# Patient Record
Sex: MALE | Race: BLACK OR AFRICAN AMERICAN | NOT HISPANIC OR LATINO | Employment: FULL TIME | URBAN - METROPOLITAN AREA
[De-identification: names, ages, dates, MRNs, and addresses within clinical notes are randomized per-mention and may not be internally consistent; named-entity substitution may affect disease eponyms.]

---

## 2023-04-18 LAB — SARS-COV-2 RESULT: NOT DETECTED

## 2023-04-20 ENCOUNTER — HOSPITAL ENCOUNTER (OUTPATIENT)
Dept: DATA CONVERSION | Facility: HOSPITAL | Age: 23
End: 2023-04-22
Attending: ORTHOPAEDIC SURGERY | Admitting: ORTHOPAEDIC SURGERY
Payer: COMMERCIAL

## 2023-04-20 DIAGNOSIS — S82.221N: ICD-10-CM

## 2023-04-20 DIAGNOSIS — T84.84XA PAIN DUE TO INTERNAL ORTHOPEDIC PROSTHETIC DEVICES, IMPLANTS AND GRAFTS, INITIAL ENCOUNTER (CMS-HCC): ICD-10-CM

## 2023-04-20 DIAGNOSIS — J96.01 ACUTE RESPIRATORY FAILURE WITH HYPOXIA (MULTI): ICD-10-CM

## 2023-04-20 DIAGNOSIS — Z79.82 LONG TERM (CURRENT) USE OF ASPIRIN: ICD-10-CM

## 2023-04-20 DIAGNOSIS — D62 ACUTE POSTHEMORRHAGIC ANEMIA: ICD-10-CM

## 2023-04-21 LAB
ANION GAP IN SER/PLAS: 15 MMOL/L (ref 10–20)
CALCIUM (MG/DL) IN SER/PLAS: 9.1 MG/DL (ref 8.6–10.6)
CARBON DIOXIDE, TOTAL (MMOL/L) IN SER/PLAS: 28 MMOL/L (ref 21–32)
CHLORIDE (MMOL/L) IN SER/PLAS: 102 MMOL/L (ref 98–107)
CREATININE (MG/DL) IN SER/PLAS: 0.88 MG/DL (ref 0.5–1.3)
ERYTHROCYTE DISTRIBUTION WIDTH (RATIO) BY AUTOMATED COUNT: 12.9 % (ref 11.5–14.5)
ERYTHROCYTE DISTRIBUTION WIDTH (RATIO) BY AUTOMATED COUNT: NORMAL
ERYTHROCYTE MEAN CORPUSCULAR HEMOGLOBIN CONCENTRATION (G/DL) BY AUTOMATED: 32.8 G/DL (ref 32–36)
ERYTHROCYTE MEAN CORPUSCULAR HEMOGLOBIN CONCENTRATION (G/DL) BY AUTOMATED: NORMAL
ERYTHROCYTE MEAN CORPUSCULAR VOLUME (FL) BY AUTOMATED COUNT: 91 FL (ref 80–100)
ERYTHROCYTE MEAN CORPUSCULAR VOLUME (FL) BY AUTOMATED COUNT: NORMAL
ERYTHROCYTES (10*6/UL) IN BLOOD BY AUTOMATED COUNT: 4.1 X10E12/L (ref 4.5–5.9)
ERYTHROCYTES (10*6/UL) IN BLOOD BY AUTOMATED COUNT: NORMAL
GFR MALE: >90 ML/MIN/1.73M2
GLUCOSE (MG/DL) IN SER/PLAS: 110 MG/DL (ref 74–99)
HEMATOCRIT (%) IN BLOOD BY AUTOMATED COUNT: 37.2 % (ref 41–52)
HEMATOCRIT (%) IN BLOOD BY AUTOMATED COUNT: NORMAL
HEMOGLOBIN (G/DL) IN BLOOD: 12.2 G/DL (ref 13.5–17.5)
HEMOGLOBIN (G/DL) IN BLOOD: NORMAL
LEUKOCYTES (10*3/UL) IN BLOOD BY AUTOMATED COUNT: 6.9 X10E9/L (ref 4.4–11.3)
LEUKOCYTES (10*3/UL) IN BLOOD BY AUTOMATED COUNT: NORMAL
NRBC (PER 100 WBCS) BY AUTOMATED COUNT: 0 /100 WBC (ref 0–0)
NRBC (PER 100 WBCS) BY AUTOMATED COUNT: NORMAL
PLATELETS (10*3/UL) IN BLOOD AUTOMATED COUNT: 198 X10E9/L (ref 150–450)
PLATELETS (10*3/UL) IN BLOOD AUTOMATED COUNT: NORMAL
POTASSIUM (MMOL/L) IN SER/PLAS: 4.4 MMOL/L (ref 3.5–5.3)
SODIUM (MMOL/L) IN SER/PLAS: 141 MMOL/L (ref 136–145)
UREA NITROGEN (MG/DL) IN SER/PLAS: 7 MG/DL (ref 6–23)

## 2023-04-22 LAB
ANION GAP IN SER/PLAS: 12 MMOL/L (ref 10–20)
CALCIUM (MG/DL) IN SER/PLAS: 8.9 MG/DL (ref 8.6–10.6)
CARBON DIOXIDE, TOTAL (MMOL/L) IN SER/PLAS: 30 MMOL/L (ref 21–32)
CHLORIDE (MMOL/L) IN SER/PLAS: 102 MMOL/L (ref 98–107)
CREATININE (MG/DL) IN SER/PLAS: 0.86 MG/DL (ref 0.5–1.3)
ERYTHROCYTE DISTRIBUTION WIDTH (RATIO) BY AUTOMATED COUNT: 12.9 % (ref 11.5–14.5)
ERYTHROCYTE MEAN CORPUSCULAR HEMOGLOBIN CONCENTRATION (G/DL) BY AUTOMATED: 32.4 G/DL (ref 32–36)
ERYTHROCYTE MEAN CORPUSCULAR VOLUME (FL) BY AUTOMATED COUNT: 91 FL (ref 80–100)
ERYTHROCYTES (10*6/UL) IN BLOOD BY AUTOMATED COUNT: 4.12 X10E12/L (ref 4.5–5.9)
GFR MALE: >90 ML/MIN/1.73M2
GLUCOSE (MG/DL) IN SER/PLAS: 86 MG/DL (ref 74–99)
GRAM STAIN: NORMAL
HEMATOCRIT (%) IN BLOOD BY AUTOMATED COUNT: 37.3 % (ref 41–52)
HEMOGLOBIN (G/DL) IN BLOOD: 12.1 G/DL (ref 13.5–17.5)
LEUKOCYTES (10*3/UL) IN BLOOD BY AUTOMATED COUNT: 6.5 X10E9/L (ref 4.4–11.3)
NRBC (PER 100 WBCS) BY AUTOMATED COUNT: 0 /100 WBC (ref 0–0)
PLATELETS (10*3/UL) IN BLOOD AUTOMATED COUNT: 177 X10E9/L (ref 150–450)
POTASSIUM (MMOL/L) IN SER/PLAS: 4.1 MMOL/L (ref 3.5–5.3)
SODIUM (MMOL/L) IN SER/PLAS: 140 MMOL/L (ref 136–145)
TISSUE/WOUND CULTURE/SMEAR: NORMAL
UREA NITROGEN (MG/DL) IN SER/PLAS: 7 MG/DL (ref 6–23)

## 2023-04-23 LAB
ANION GAP IN SER/PLAS: NORMAL
CALCIUM (MG/DL) IN SER/PLAS: NORMAL
CARBON DIOXIDE, TOTAL (MMOL/L) IN SER/PLAS: NORMAL
CHLORIDE (MMOL/L) IN SER/PLAS: NORMAL
CREATININE (MG/DL) IN SER/PLAS: NORMAL
GFR FEMALE: NORMAL
GFR MALE: NORMAL
GLUCOSE (MG/DL) IN SER/PLAS: NORMAL
POTASSIUM (MMOL/L) IN SER/PLAS: NORMAL
SODIUM (MMOL/L) IN SER/PLAS: NORMAL
UREA NITROGEN (MG/DL) IN SER/PLAS: NORMAL

## 2023-04-24 LAB
ERYTHROCYTE DISTRIBUTION WIDTH (RATIO) BY AUTOMATED COUNT: NORMAL
ERYTHROCYTE MEAN CORPUSCULAR HEMOGLOBIN CONCENTRATION (G/DL) BY AUTOMATED: NORMAL
ERYTHROCYTE MEAN CORPUSCULAR VOLUME (FL) BY AUTOMATED COUNT: NORMAL
ERYTHROCYTES (10*6/UL) IN BLOOD BY AUTOMATED COUNT: NORMAL
HEMATOCRIT (%) IN BLOOD BY AUTOMATED COUNT: NORMAL
HEMOGLOBIN (G/DL) IN BLOOD: NORMAL
LEUKOCYTES (10*3/UL) IN BLOOD BY AUTOMATED COUNT: NORMAL
NRBC (PER 100 WBCS) BY AUTOMATED COUNT: NORMAL
PLATELETS (10*3/UL) IN BLOOD AUTOMATED COUNT: NORMAL

## 2023-05-08 LAB
FUNGAL CULTURE/SMEAR: NORMAL
FUNGAL SMEAR: NORMAL

## 2023-08-07 ENCOUNTER — OFFICE VISIT (OUTPATIENT)
Dept: PRIMARY CARE | Facility: CLINIC | Age: 23
End: 2023-08-07
Payer: COMMERCIAL

## 2023-08-07 ENCOUNTER — LAB (OUTPATIENT)
Dept: LAB | Facility: LAB | Age: 23
End: 2023-08-07
Payer: COMMERCIAL

## 2023-08-07 VITALS
HEART RATE: 123 BPM | WEIGHT: 135 LBS | BODY MASS INDEX: 19.37 KG/M2 | DIASTOLIC BLOOD PRESSURE: 82 MMHG | TEMPERATURE: 98.2 F | SYSTOLIC BLOOD PRESSURE: 138 MMHG

## 2023-08-07 DIAGNOSIS — D50.8 OTHER IRON DEFICIENCY ANEMIA: ICD-10-CM

## 2023-08-07 DIAGNOSIS — R04.0 EPISTAXIS: Primary | ICD-10-CM

## 2023-08-07 LAB
ALANINE AMINOTRANSFERASE (SGPT) (U/L) IN SER/PLAS: 8 U/L (ref 10–52)
ALBUMIN (G/DL) IN SER/PLAS: 4.2 G/DL (ref 3.4–5)
ALKALINE PHOSPHATASE (U/L) IN SER/PLAS: 88 U/L (ref 33–120)
ANION GAP IN SER/PLAS: 12 MMOL/L (ref 10–20)
ASPARTATE AMINOTRANSFERASE (SGOT) (U/L) IN SER/PLAS: 13 U/L (ref 9–39)
BILIRUBIN TOTAL (MG/DL) IN SER/PLAS: 0.3 MG/DL (ref 0–1.2)
CALCIUM (MG/DL) IN SER/PLAS: 9.4 MG/DL (ref 8.6–10.6)
CARBON DIOXIDE, TOTAL (MMOL/L) IN SER/PLAS: 31 MMOL/L (ref 21–32)
CHLORIDE (MMOL/L) IN SER/PLAS: 103 MMOL/L (ref 98–107)
CREATININE (MG/DL) IN SER/PLAS: 0.92 MG/DL (ref 0.5–1.3)
ERYTHROCYTE DISTRIBUTION WIDTH (RATIO) BY AUTOMATED COUNT: 12.1 % (ref 11.5–14.5)
ERYTHROCYTE MEAN CORPUSCULAR HEMOGLOBIN CONCENTRATION (G/DL) BY AUTOMATED: 32.7 G/DL (ref 32–36)
ERYTHROCYTE MEAN CORPUSCULAR VOLUME (FL) BY AUTOMATED COUNT: 88 FL (ref 80–100)
ERYTHROCYTES (10*6/UL) IN BLOOD BY AUTOMATED COUNT: 4.77 X10E12/L (ref 4.5–5.9)
FERRITIN (UG/LL) IN SER/PLAS: 178 UG/L (ref 20–300)
GFR MALE: >90 ML/MIN/1.73M2
GLUCOSE (MG/DL) IN SER/PLAS: 86 MG/DL (ref 74–99)
HEMATOCRIT (%) IN BLOOD BY AUTOMATED COUNT: 41.9 % (ref 41–52)
HEMOGLOBIN (G/DL) IN BLOOD: 13.7 G/DL (ref 13.5–17.5)
IRON (UG/DL) IN SER/PLAS: 37 UG/DL (ref 35–150)
IRON BINDING CAPACITY (UG/DL) IN SER/PLAS: 314 UG/DL (ref 240–445)
IRON SATURATION (%) IN SER/PLAS: 12 % (ref 25–45)
LEUKOCYTES (10*3/UL) IN BLOOD BY AUTOMATED COUNT: 10.6 X10E9/L (ref 4.4–11.3)
NRBC (PER 100 WBCS) BY AUTOMATED COUNT: 0 /100 WBC (ref 0–0)
PLATELETS (10*3/UL) IN BLOOD AUTOMATED COUNT: 257 X10E9/L (ref 150–450)
POTASSIUM (MMOL/L) IN SER/PLAS: 4.5 MMOL/L (ref 3.5–5.3)
PROTEIN TOTAL: 7.6 G/DL (ref 6.4–8.2)
SODIUM (MMOL/L) IN SER/PLAS: 141 MMOL/L (ref 136–145)
THYROTROPIN (MIU/L) IN SER/PLAS BY DETECTION LIMIT <= 0.05 MIU/L: 0.72 MIU/L (ref 0.44–3.98)
UREA NITROGEN (MG/DL) IN SER/PLAS: 12 MG/DL (ref 6–23)

## 2023-08-07 PROCEDURE — 36415 COLL VENOUS BLD VENIPUNCTURE: CPT

## 2023-08-07 PROCEDURE — 80053 COMPREHEN METABOLIC PANEL: CPT

## 2023-08-07 PROCEDURE — 99213 OFFICE O/P EST LOW 20 MIN: CPT | Performed by: INTERNAL MEDICINE

## 2023-08-07 PROCEDURE — 1036F TOBACCO NON-USER: CPT | Performed by: INTERNAL MEDICINE

## 2023-08-07 PROCEDURE — 85027 COMPLETE CBC AUTOMATED: CPT

## 2023-08-07 PROCEDURE — 84443 ASSAY THYROID STIM HORMONE: CPT

## 2023-08-07 PROCEDURE — 83550 IRON BINDING TEST: CPT

## 2023-08-07 PROCEDURE — 82728 ASSAY OF FERRITIN: CPT

## 2023-08-07 PROCEDURE — 83540 ASSAY OF IRON: CPT

## 2023-08-07 ASSESSMENT — ENCOUNTER SYMPTOMS
ABDOMINAL PAIN: 0
ARTHRALGIAS: 0
DIARRHEA: 0
COUGH: 0
DIZZINESS: 0
CONSTIPATION: 0
FATIGUE: 0
NECK PAIN: 0
FEVER: 0
MYALGIAS: 0
SORE THROAT: 1
HEADACHES: 0
PALPITATIONS: 0
BLOOD IN STOOL: 0
CHILLS: 0
SHORTNESS OF BREATH: 0
BACK PAIN: 0

## 2023-08-07 NOTE — PATIENT INSTRUCTIONS
Make appointment with ENT if nosebleed persists  Blood work today  Follow-up in 3 to 6 months or sooner if needed

## 2023-08-07 NOTE — PROGRESS NOTES
Subjective   Patient ID: Romero Landis is a 22 y.o. male.    HPI  Seen today for complaints of blood through his nostrils.  He has been experiencing nosebleeding for  about a week.  He complains of runny nose, congestion and related upper respiratory symptoms.  No fever or chills.  He complains of some sore throat.    Review of Systems   Constitutional:  Negative for chills, fatigue and fever.   HENT:  Positive for congestion, postnasal drip and sore throat.    Respiratory:  Negative for cough and shortness of breath.    Cardiovascular:  Negative for chest pain, palpitations and leg swelling.   Gastrointestinal:  Negative for abdominal pain, blood in stool, constipation and diarrhea.   Endocrine: Negative for cold intolerance and heat intolerance.   Musculoskeletal:  Negative for arthralgias, back pain, myalgias and neck pain.   Neurological:  Negative for dizziness and headaches.       Objective   Physical Exam  Vitals reviewed.   Constitutional:       General: He is not in acute distress.     Appearance: Normal appearance.   HENT:      Head: Normocephalic and atraumatic.      Nose: Congestion present.      Comments: No active bleeding seen     Mouth/Throat:      Mouth: Mucous membranes are moist.   Eyes:      Extraocular Movements: Extraocular movements intact.      Conjunctiva/sclera: Conjunctivae normal.   Cardiovascular:      Rate and Rhythm: Normal rate and regular rhythm.      Pulses: Normal pulses.   Pulmonary:      Effort: Pulmonary effort is normal. No respiratory distress.      Breath sounds: Normal breath sounds.   Abdominal:      General: Bowel sounds are normal. There is no distension.      Tenderness: There is no abdominal tenderness.   Musculoskeletal:         General: No swelling or tenderness. Normal range of motion.      Cervical back: Normal range of motion.   Skin:     General: Skin is warm.   Neurological:      General: No focal deficit present.      Mental Status: He is alert.       Coordination: Coordination normal.      Gait: Gait normal.   Psychiatric:         Mood and Affect: Mood normal.         Behavior: Behavior normal.         Assessment/Plan   Diagnoses and all orders for this visit:  Epistaxis  Comments:  Epistaxis-patient to make appointment with ENT  Orders:  -     Referral to ENT; Future  Other iron deficiency anemia  Comments:  Check iron panel  Will consider sending to hematology if anemia is worsening  Orders:  -     CBC; Future  -     Comprehensive Metabolic Panel; Future  -     TSH with reflex to Free T4 if abnormal; Future  -     Iron and TIBC; Future  -     Ferritin; Future  Blood work is ordered  Follow-up in 3 to 6 months or sooner if needed

## 2023-08-08 ENCOUNTER — TELEPHONE (OUTPATIENT)
Dept: PRIMARY CARE | Facility: CLINIC | Age: 23
End: 2023-08-08
Payer: COMMERCIAL

## 2023-09-07 VITALS
OXYGEN SATURATION: 100 % | DIASTOLIC BLOOD PRESSURE: 70 MMHG | WEIGHT: 135.14 LBS | SYSTOLIC BLOOD PRESSURE: 117 MMHG | HEART RATE: 81 BPM | BODY MASS INDEX: 18.92 KG/M2 | HEIGHT: 71 IN

## 2023-09-14 NOTE — DISCHARGE SUMMARY
Send Summary:   Discharge Summary Providers:  Provider Role Provider Name   · Attending Nomi Roper       Note Recipients: Nomi Roper MD Parmar, Rajinder, MD       Discharge:    Summary:   Admission Date: .20-Apr-2023 05:51:00   Discharge Date: 22-Apr-2023   Attending Physician at Discharge: Nomi Roper   Admission Reason: s/p IMN R tibia   Final Discharge Diagnoses: Tibia/fibula fracture,  shaft, right, sequela   Procedures: Date: 20-Apr-2023 09:52:00  Procedure Name: 1. Removal of hardware left tibia  2. Removal of hardware right tibia  3. 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 Patient is now s/p R tibia IMN exchange nailing and L tibia JOSÉ MIGUEL  on 4/20/23 by Dr. Roper. On the day of surgery, patient was identified in the pre-operative holding area and agreeable to proceed with surgery. Written consent was obtained.  Please see operative note for further details of this procedure. Patient  received 24 hours of ori-operative antibiotics. Patient recovered in the PACU before transfer to a regular nursing floor. Patient was started on oxycodone, tylenol, and hydromorphone for pain control and ASA 81 mg bid for DVT prophylaxis. Physical therapy  recommended continued recovery at home with continued physical therapy and wound care. On the day of discharge, patient was afebrile with stable vital signs. Patient was neurovascularly intact at time of discharge. Patient was discharged with prescription  of ASA 81 mg bid for DVT prophylaxis for 4 weeks. Patient will follow-up with Dr. Roper in 2 weeks for post-operative visit.     Immunizations:    Immunizations:  26-Feb-2022   SARS-CoV-2 (COVID-19): Immunizations,  26-Feb-2022      Discharge Information:    and Continuing Care:   Lab Results - Pending:    Culture, Fungus + Fungus Stain Drawn at 20-Apr-2023 08:52:00  Radiology Results - Pending: None   Discharge Instructions:    Activity:           activity with assistance.          Weight-bearing Instructions: full weight bearing bilateral leg.      Nutrition/Diet:           resume normal diet    Wound Care:           Wound Site:   Bilateral lower legs          Wound Type:   surgical incision          Instructions:   no lotions, creams, or tub soaks          Other Instructions:   Leave operative dressing in place until post op day 7. Then remove and leave incision open to air. Let water run freely over incision when showering, do not scrub. Do not soak in pool or tub.    Home Care Certification:           Home Care Agency:    [facility] [phone]          Skilled Disciplines Ordered:   PT    Home Care Services:           Home Care Skilled Service:   Rehab (PT/OT/SP eval and treat)    Follow Up Appointments:    Follow-Up Appointment 01:           Physician/Dept/Service:   Luis Coles PA-C / Orthopedic Surgery          Scheduled Date/Time:   02-May-2023 08:40          Location:   [place] [address]          Phone Number:   [phone]    Discharge Medications: Home Medication   Colace 100 mg oral capsule - 1 cap(s) orally 2 times a day for constipation while taking narcotics  omeprazole 10 mg oral delayed release capsule - 1 cap(s) orally once a day while taking aspirin  Calcet 500 mg-400 intl units (10 mcg) oral tablet, chewable - 1 tab(s) chewed 2 times a day   ondansetron 4 mg oral tablet - 1 tab(s) orally every 6 hours for nausea  oxycodone-acetaminophen 5 mg-325 mg oral tablet - 1 tab(s) orally every 6 hours for severe pain only  Adult Aspirin Regimen 81 mg oral delayed release tablet - 1 tab(s) orally 2 times a day      PRN  Medication     DNR Status:   ·  Code Status Code Status order at time of discharge: Full Code     Attestation:   Note Completion:  I am a:  Resident/Fellow   Attending Attestation I saw and evaluated the patient.  I personally obtained the key and critical portions of the history and physical exam or was physically present for key and  critical portions performed by the resident/fellow. I reviewed the resident/fellow?s documentation and discussed the patient with the resident/fellow.  I agree with the resident/fellow?s medical decision making as documented in the note.     I personally evaluated the patient on 21-Apr-2023         Electronic Signatures:  Nomi Roper)  (Signed 03-May-2023 10:15)   Authored: Summary Content, Ongoing Care, Note Completion   Co-Signer: Send Summary, Summary Content, Immunizations, Ongoing Care, DNR Status, Note Completion  José Aguero (Resident))  (Signed 22-Apr-2023 18:36)   Authored: Send Summary, Summary Content, Immunizations,  Ongoing Care, DNR Status, Note Completion      Last Updated: 03-May-2023 10:15 by Nomi Roper)

## 2023-09-14 NOTE — PROGRESS NOTES
Service: Orthopaedics     Subjective Data:   HUDSON CATALAN is a 22 year old Male who is Hospital Day # 3 and POD #2 for 1. Removal of hardware left tibia;2. Removal of hardware right tibia;3. Intra-medullary nail right tibia;4. 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     no acute events overnight  pain controlled  tolerating PO  denies new or worsening numbness/paresthesias in extremity   denies fevers/chills/chest pain/shortness of breath/nausea/vomiting.    Objective Data:     Objective Information:      T   P  R  BP   MAP  SpO2   Value  37.1  87  18  95/62      96%  Date/Time 4/22 7:24 4/22 7:24 4/22 7:24 4/22 7:24    4/22 7:24  Range  (36.6C - 37.5C )  (75 - 92 )  (16 - 19 )  (95 - 115 )/ (62 - 77 )    (95% - 99% )  Highest temp of 37.5 C was recorded at 4/21 12:10      Pain reported at 4/21 20:23: 5 = Moderate    Physical Exam Narrative:  ·  Physical Exam:      Constitutional: Well developed, awake/alert/oriented x3, no distress, alert and cooperative  Eyes: EOMI, clear sclera  Head/Neck: Normocephalic/atraumatic  Respiratory/Thorax: Normal Respiration rate, good chest expansion, thorax symmetric  Cardiovascular: Extremities WWP  Musculoskeletal:  BLE: Dressing c/d/i  - SILT s/s/sp/dp/t  - fires PF/DF/EHL  - Toes WWP, 2+ DP pulses  - Calf soft and supple bilat   Extremities: See MSK  Neurological: See MSK  Lymphatic: No significant lymphadenopathy  Psychological: Appropriate mood and behavior  Skin: Warm and dry, no lesions, no rashes     Medication:    Medications:          Continuous Medications       --------------------------------  No continuous medications are active       Scheduled Medications       --------------------------------    1. Acetaminophen:  650  mg  Oral  Every 6 Hours    2. Aspirin Enteric Coated:  81  mg  Oral  2 Times a Day    3. Docusate:  100  mg  Oral  2 Times a Day    4. Polyethylene Glycol:  17  gram(s)  Oral  2 Times a Day    5. Sennosides:  1  tablet(s)   Oral  Daily         PRN Medications       --------------------------------    1. diphenhydrAMINE:  25  mg  Oral  Every 6 Hours    2. HYDROmorphone Injectable:  0.5  mg  IntraVenous Push  Every 4 Hours    3. Ondansetron Injectable:  4  mg  IntraVenous Push  Every 6 Hours    4. oxyCODONE Immediate Release:  5  mg  Oral  Every 4 Hours    5. oxyCODONE Immediate Release:  10  mg  Oral  Every 4 Hours    6. Sore Throat Lozenge:  1  lozenge(s)  Oral  Every 4 Hours        Recent Lab Results:    Results:    CBC: 4/22/2023 07:11              \     Hgb     /                              \     12.1 L    /  WBC  ----------------  Plt               6.5       ----------------    177              /     Hct     \                              /     37.3 L    \            RBC: 4.12 L    MCV: 91           BMP: 4/22/2023 07:11  NA+        Cl-     BUN  /                         140    102    7  /  --------------------------------  Glucose                ---------------------------  86    K+     HCO3-   Creat \                         4.1  30    0.86  \  Calcium : 8.9     Anion Gap : 12      Assessment and Plan:   Code Status:  ·  Code Status Full Code     Assessment:    Romero Landis is a 22M s/p GILLIAN VALDEZ, exchange FRANKI stewart on 4/20/23 with Dr. Roper    - Clear liquid diet, okay to advance as tolerated. Bowel Regimen: Colace, senna, dulcolax.  - Multimodal pain therapy: scheduled tylenol, prn oxycodone, dilaudid prn for breakthrough  - mIVF to continue until patient is tolerating good PO intake, HLIV w/ good PO; Will monitor BMP on POD 1 and prn thereafter  - Weightbearing: WBAT BLE. PT/OT consult, to see by POD1 at the latest.   - Encourage IS   - Perioperative ancef q8 for 24 hours  - No indication for transfusion; monitor CBC POD1, repeat prn thereafter.  - DVT PPx: SCD, ASA 81 mg BID for chemoPPx   - Maintain PIV, May dc venkata on POD1  - Continue home meds:   - Glycemic: No issues   - Culture negative to  date  - PT/OT rec [facility]    Dispo: dispo [facility]    This plan was discussed with the attending, Dr. Roper    Staffed with attending. Please reach out with any questions.    Rafael Li DO  PGY-1  [facility]  [facility] preferred   Please page #[phone] on weekends or on weekdays after 6 PM.          Attestation:   Note Completion:  I am a:  Resident/Fellow   Attending Attestation I reviewed the resident/fellow?s documentation and discussed the patient with the resident/fellow.  I agree with the resident/fellow?s medical  decision making as documented in the note.          Electronic Signatures:  Rafael Li (Resident))  (Signed 22-Apr-2023 09:26)   Authored: Service, Subjective Data, Objective Data, Assessment  and Plan, Note Completion  Nomi Roper)  (Signed 03-May-2023 10:11)   Authored: Note Completion   Co-Signer: Service, Subjective Data, Objective Data, Assessment and Plan, Note Completion      Last Updated: 03-May-2023 10:11 by Nomi Roper)

## 2023-09-14 NOTE — PROGRESS NOTES
Service: Orthopaedics     Subjective Data:   HUDSON CATALAN is a 22 year old Male who is Hospital Day # 2 and POD #1 for 1. Removal of hardware left tibia;2. Removal of hardware right tibia;3. Intra-medullary nail right tibia;4. 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     no acute events overnight  pain controlled  tolerating PO  denies new or worsening numbness/paresthesias in extremity   denies fevers/chills/chest pain/shortness of breath/nausea/vomiting.    Objective Data:     Objective Information:      T   P  R  BP   MAP  SpO2   Value  36.6  88  16  109/65      97%  Date/Time 4/21 4:00 4/21 4:00 4/21 4:00 4/21 4:00    4/21 4:00  Range  (36.5C - 37.1C )  (79 - 90 )  (16 - 16 )  (106 - 119 )/ (65 - 77 )    (97% - 100% )  Highest temp of 37.1 C was recorded at 4/20 15:37      Pain reported at 4/21 5:37: 8 = Severe    Physical Exam Narrative:  ·  Physical Exam:    S: no acute events overnight, pain controlled, tolerating diet  denies numbness/paresthesias in operative extremity   denies fevers/chills/chest pain/shortness of breath/chest pain/nausea/vomiting     O:  Constitutional: Well developed, awake/alert/oriented x3, no distress, alert and cooperative  Eyes: EOMI, clear sclera  Head/Neck: Normocephalic/atraumatic  Respiratory/Thorax: Normal Respiration rate, good chest expansion, thorax symmetric  Cardiovascular: Extremities WWP  Musculoskeletal:  BLE: Dressing c/d/i  - SILT s/s/sp/dp/t  - fires PF/DF/EHL  - Toes WWP, 2+ DP pulses  - Calf soft and supple bilat   Extremities: See MSK  Neurological: See MSK  Lymphatic: No significant lymphadenopathy  Psychological: Appropriate mood and behavior  Skin: Warm and dry, no lesions, no rashes     Assessment and Plan:   Code Status:  ·  Code Status Full Code     Assessment:    Hudson Catalan is a 22M s/p R tibia JOSÉ MIGUEL, exchange nialing, L tibia JOSÉ MIGUEL on 4/20/23 with Dr. Roper    - Clear liquid diet, okay to advance as tolerated. Bowel Regimen:  Colace, senna, dulcolax.  - Multimodal pain therapy: scheduled tylenol, prn oxycodone, dilaudid prn for breakthrough  - mIVF to continue until patient is tolerating good PO intake, HLIV w/ good PO; Will monitor BMP on POD 1 and prn thereafter  - Weightbearing: WBAT BLE. PT/OT consult, to see by POD1 at the latest.   - Encourage IS   - Perioperative ancef q8 for 24 hours  - No indication for transfusion; monitor CBC POD1, repeat prn thereafter.  - DVT PPx: SCD, ASA 81 mg BID for chemoPPx   - Maintain PIV, May dc hastnigs on POD1  - Continue home meds:   - Glycemic: No issues     Dispo: PT/OT, cultures    This plan was discussed with the attending, Dr. Roper    Staffed with attending. Please reach out with any questions.    Rafael Li DO  PGY-1  [facility]  [employer] preferred   Please page #[phone] on weekends or on weekdays after 6 PM.          Attestation:   Note Completion:  I am a:  Resident/Fellow   Attending Attestation I saw and evaluated the patient.  I personally obtained the key and critical portions of the history and physical exam or was physically present for key and  critical portions performed by the resident/fellow. I reviewed the resident/fellow?s documentation and discussed the patient with the resident/fellow.  I agree with the resident/fellow?s medical decision making as documented in the note.     I personally evaluated the patient on 21-Apr-2023         Electronic Signatures:  Rafael Li (Resident))  (Signed 21-Apr-2023 06:39)   Authored: Service, Subjective Data, Objective Data, Assessment  and Plan, Note Completion  Nomi Roper)  (Signed 03-May-2023 09:48)   Authored: Note Completion   Co-Signer: Service, Subjective Data, Objective Data, Assessment and Plan, Note Completion      Last Updated: 03-May-2023 09:48 by Nomi Roper)

## 2023-09-14 NOTE — H&P
History of Present Illness:   History Present Illness:  Reason for surgery: R tibia nonunion   HPI:    Pt presents for surgery    Allergies:        Allergies:  ·  No Known Allergies :     Home Medication Review:   Home Medications Reviewed: yes     Impression/Procedure:   ·  Impression and Planned Procedure: R tibia exchange IMN, L tibia JOSÉ MIGUEL       ERAS (Enhanced Recovery After Surgery):  ·  ERAS Patient: no       Physical Exam by System:    Constitutional: Well developed, awake/alert/oriented  x3, no distress, alert and cooperative   Respiratory/Thorax: Patent airways, CTAB, normal  breath sounds with good chest expansion, thorax symmetric   Cardiovascular: Regular, rate and rhythm, no murmurs,  2+ equal pulses of the extremities, normal S 1and S 2   Musculoskeletal: ROM intact, no joint swelling, normal  strength   Extremities: normal extremities, no cyanosis edema,  contusions or wounds, no clubbing   Neurological: alert and oriented x3, intact senses,  motor, response and reflexes, normal strength   Psychological: Appropriate mood and behavior   Skin: Warm and dry, no lesions, no rashes     Consent:   COVID-19 Consent:  ·  COVID-19 Risk Consent Surgeon has reviewed key risks related to the risk of jose roberto COVID-19 and if they contract COVID-19 what the risks are.     Attestation:   Note Completion:  I am a:  Resident/Fellow   Attending Attestation I saw and evaluated the patient.  I personally obtained the key and critical portions of the history and physical exam or was physically present for key and  critical portions performed by the resident/fellow. I reviewed the resident/fellow?s documentation and discussed the patient with the resident/fellow.  I agree with the resident/fellow?s medical decision making as documented in the note.     I personally evaluated the patient on 20-Apr-2023         Electronic Signatures:  Nomi Roper)  (Signed 01-May-2023 08:04)   Authored: Note  Completion   Co-Signer: History of Present Illness, Allergies, Home Medication Review, Impression/Procedure, ERAS, Physical Exam, Consent, Note Completion  José Aguero (Resident))  (Signed 20-Apr-2023 04:47)   Authored: History of Present Illness, Allergies, Home  Medication Review, Impression/Procedure, ERAS, Physical Exam, Consent, Note Completion      Last Updated: 01-May-2023 08:04 by Nomi Roper)

## 2023-09-22 PROBLEM — S52.90XA RADIUS FRACTURE: Status: ACTIVE | Noted: 2023-09-22

## 2023-09-22 PROBLEM — K64.4 EXTERNAL HEMORRHOID: Status: ACTIVE | Noted: 2023-09-22

## 2023-09-22 PROBLEM — R04.0 EPISTAXIS: Status: ACTIVE | Noted: 2023-09-22

## 2023-09-22 PROBLEM — S82.202G: Status: ACTIVE | Noted: 2023-09-22

## 2023-09-22 PROBLEM — T84.84XA PAINFUL ORTHOPAEDIC HARDWARE (CMS-HCC): Status: ACTIVE | Noted: 2023-09-22

## 2023-09-22 PROBLEM — K92.1 BLOOD IN STOOL: Status: ACTIVE | Noted: 2023-09-22

## 2023-09-22 PROBLEM — S82.209A TIBIA/FIBULA FRACTURE: Status: ACTIVE | Noted: 2023-09-22

## 2023-09-22 PROBLEM — S12.9XXA CERVICAL SPINE FRACTURE (MULTI): Status: ACTIVE | Noted: 2023-09-22

## 2023-09-22 PROBLEM — N50.9 SCROTAL LESION: Status: ACTIVE | Noted: 2023-09-22

## 2023-09-22 PROBLEM — S82.409A TIBIA/FIBULA FRACTURE: Status: ACTIVE | Noted: 2023-09-22

## 2023-09-22 PROBLEM — S82.252A CLOSED DISPLACED COMMINUTED FRACTURE OF SHAFT OF LEFT TIBIA: Status: ACTIVE | Noted: 2023-09-22

## 2023-09-22 PROBLEM — S52.023A OLECRANON FRACTURE: Status: ACTIVE | Noted: 2023-09-22

## 2023-09-22 RX ORDER — ACETAMINOPHEN 325 MG/1
TABLET ORAL
COMMUNITY
Start: 2022-07-10

## 2023-09-22 RX ORDER — DOCUSATE SODIUM 100 MG/1
CAPSULE, LIQUID FILLED ORAL
COMMUNITY
Start: 2022-07-27 | End: 2023-10-23 | Stop reason: WASHOUT

## 2023-09-22 RX ORDER — MUPIROCIN 20 MG/G
OINTMENT TOPICAL
COMMUNITY
Start: 2023-08-16 | End: 2023-10-23 | Stop reason: WASHOUT

## 2023-09-22 RX ORDER — LORATADINE 10 MG/1
CAPSULE, LIQUID FILLED ORAL
COMMUNITY
End: 2023-10-23 | Stop reason: WASHOUT

## 2023-09-22 RX ORDER — METHOCARBAMOL 500 MG/1
TABLET, FILM COATED ORAL
COMMUNITY
Start: 2022-07-27 | End: 2023-10-23 | Stop reason: WASHOUT

## 2023-09-22 RX ORDER — LIDOCAINE 560 MG/1
PATCH PERCUTANEOUS; TOPICAL; TRANSDERMAL
COMMUNITY
Start: 2022-07-10 | End: 2023-10-23 | Stop reason: WASHOUT

## 2023-09-22 RX ORDER — METHOCARBAMOL 500 MG/1
TABLET, FILM COATED ORAL 4 TIMES DAILY
COMMUNITY
Start: 2022-07-10 | End: 2023-10-23 | Stop reason: WASHOUT

## 2023-09-22 RX ORDER — GABAPENTIN 300 MG/1
1 CAPSULE ORAL NIGHTLY
COMMUNITY
Start: 2022-08-13

## 2023-09-22 RX ORDER — POLYETHYLENE GLYCOL 3350 17 G/17G
17 POWDER, FOR SOLUTION ORAL 2 TIMES DAILY
COMMUNITY
Start: 2022-07-10 | End: 2023-10-23 | Stop reason: WASHOUT

## 2023-09-22 RX ORDER — OXYCODONE HYDROCHLORIDE 5 MG/1
TABLET ORAL
COMMUNITY
Start: 2022-07-10 | End: 2023-10-23 | Stop reason: WASHOUT

## 2023-09-22 RX ORDER — AMOXICILLIN 500 MG/1
CAPSULE ORAL
COMMUNITY
Start: 2023-05-16 | End: 2023-10-23 | Stop reason: WASHOUT

## 2023-09-22 RX ORDER — BACITRACIN 500 [USP'U]/G
OINTMENT TOPICAL 2 TIMES DAILY
COMMUNITY
Start: 2022-07-10 | End: 2023-10-23 | Stop reason: WASHOUT

## 2023-09-22 RX ORDER — TALC
POWDER (GRAM) TOPICAL
COMMUNITY
Start: 2022-07-10 | End: 2023-10-23 | Stop reason: WASHOUT

## 2023-09-22 RX ORDER — MELOXICAM 7.5 MG/1
1 TABLET ORAL DAILY
COMMUNITY
Start: 2023-01-31 | End: 2023-10-23 | Stop reason: WASHOUT

## 2023-09-22 RX ORDER — OXYCODONE AND ACETAMINOPHEN 5; 325 MG/1; MG/1
1 TABLET ORAL EVERY 6 HOURS PRN
COMMUNITY
Start: 2023-05-02 | End: 2023-10-23 | Stop reason: WASHOUT

## 2023-09-22 RX ORDER — OXYMETAZOLINE HCL 0.05 %
SPRAY, NON-AEROSOL (ML) NASAL
COMMUNITY
Start: 2023-08-16

## 2023-09-22 RX ORDER — IBUPROFEN 600 MG/1
600 TABLET ORAL EVERY 8 HOURS PRN
COMMUNITY
Start: 2021-09-27

## 2023-09-22 RX ORDER — SENNOSIDES 8.6 MG/1
TABLET ORAL 2 TIMES DAILY
COMMUNITY
Start: 2022-07-10 | End: 2023-10-23 | Stop reason: WASHOUT

## 2023-09-22 RX ORDER — ASPIRIN 81 MG/1
81 TABLET ORAL 2 TIMES DAILY
COMMUNITY
Start: 2023-04-20 | End: 2023-10-23 | Stop reason: WASHOUT

## 2023-09-22 RX ORDER — ASPIRIN 81 MG/1
1 TABLET ORAL DAILY
COMMUNITY
Start: 2022-07-27 | End: 2023-10-23 | Stop reason: WASHOUT

## 2023-09-22 RX ORDER — GABAPENTIN 100 MG/1
CAPSULE ORAL
COMMUNITY
Start: 2022-07-10 | End: 2023-10-23 | Stop reason: WASHOUT

## 2023-09-22 RX ORDER — HYDROCORTISONE ACETATE 25 MG/1
SUPPOSITORY RECTAL 2 TIMES DAILY
COMMUNITY
Start: 2022-12-12 | End: 2023-10-23 | Stop reason: WASHOUT

## 2023-09-22 RX ORDER — BACITRACIN 500 [USP'U]/G
OINTMENT TOPICAL 3 TIMES DAILY
COMMUNITY
Start: 2022-08-02 | End: 2023-10-23 | Stop reason: WASHOUT

## 2023-09-22 RX ORDER — BACITRACIN ZINC 500 UNIT/G
OINTMENT (GRAM) TOPICAL 3 TIMES DAILY
COMMUNITY
Start: 2022-08-02 | End: 2023-10-23 | Stop reason: WASHOUT

## 2023-09-22 RX ORDER — GABAPENTIN 100 MG/1
CAPSULE ORAL
COMMUNITY
End: 2023-10-23 | Stop reason: WASHOUT

## 2023-10-02 NOTE — OP NOTE
PROCEDURE DETAILS    Preoperative Diagnosis:  Right tibial shaft nonunion, symptomatic hardware left tibia  Postoperative Diagnosis:  Right tibial shaft nonunion, symptomatic hardware left tibia  Surgeon: Jacquelin  Resident/Fellow/Other Assistant: Sanket Mcgovern    Procedure:  1. Removal of hardware left tibia  2. Removal of hardware right tibia  3. Exchange Intra-medullary nail right tibia and Application of autograft right tibial shaft nonunion  Anesthesia: General  Estimated Blood Loss: 50  Findings: See op report  Specimens(s) Collected: yes,  Culture right tibial nail   Complications: none  Drains and/or Catheters: none  Implants: Ira T2 tibial nail   Additional Details: Plan:  - Weight bearing: WBAT BLE  - DVT ppx: SCDs, ASA 81 bid  - Diet: Clear liquid diet. advanced as tolerated  - Pain protocol  - Antibiotics: perioperative abx x 24 hours  - FEN: Continue LR at 100cc/hr; HLIV with good PO intake  - Bowel Regimen  - PT consult  - Pulm: Encourage IS          Operative Report:   Date of Procedure: 4/20/23  Patient's name: Romero Landis  MRN:  [id number]      Surgeon: Nomi Roper MD  Assistants: Abraham Mcgovern MD, Luis Coles PA-C    Indications: Patient has previously undergone bilateral tibia intramedullary nailing for bilateral tibial shaft fractures.  The right tibia has gone on to nonunion which has been shown  with repeat radiographs over time and a recent CT scan.  This has become very symptomatic for him with weightbearing.  Additionally he is having sensitivity over his proximal and distal interlocking screws on the left tibia.  We discussed surgical intervention  in the form of exchange nailing with nonunion repair as well as removal of hardware of his symptomatic interlocking screws in the left.  The risks, benefits, and alternatives to surgical and nonsurgical treatment were discussed with the patient and family,  and afterwards, the patient expressed his/her  wishes to proceed with surgery.     Narrative: The patient was greeted in the preoperative holding area and the surgical site was marked. Informed consent was reviewed and he expressed wishes to proceed. He was taken  to the operative suite where general endotracheal anesthesia was induced by the department of anesthesia. Patient was transferred to the operating table and positioned supine. A bump was placed under the right hip. All bony prominences were well padded.  The entirety of bilateral lower extremities were prepped and draped in routine sterile fashion. A timeout was performed with the patient?s surgical site, surgical side, and procedure appropriately identified by all parties in the room.     Antibiotics were administered within 60 minutes of incision.    We began with removal of hardware from the left tibia.  The distal screws could be palpated and incisions were made using the prior incisional scars.  Dissection was carried down with  Bovie electrocautery until the screw heads were visualized and removed.  The process was repeated for 2 proximal interlocking screws.  The complete removal of the screws was confirmed on fluoroscopy.  The intramedullary nail itself was left in place.   Fluoroscopy also confirmed healing of the left tibial shaft fracture.      We then turned our attention to the right tibial nail removal.  The distal interlocking screws were removed in similar fashion to the contralateral side.  The proximal screws were left in place so as to provide a counter torque and rotational stability  for application of the nail extraction device.  A midline incision superior to the patella was created using the prior surgical scar and the quadricep tendon was incised in the midline to enter the knee joint.  Insertion trocar was inserted into the patellofemoral  space and a guidewire was used to localize the superior aspect of the nail.  The cannulated nail removal device was then applied and  screwed into the nail.  The proximal interlocking screws were removed and the nail was backed out of the tibia uneventfully.   The nail was found to be  10 mm x 375 mm.  Cultures were taken of the nail.    Fluoroscopy was used to confirm complete removal of hardware and the nonunion site was visualized.  We then inserted a ball-tipped guidewire down the length of the tibia and the prior nail path and seated it in the distal physeal scar.  We sequentially  reamed up to size 14.  In this process we took care to ream the nonunion site and apply local autograft by technique.  We then proceeded to insert a size 12 mm x 375 mm Pete T2 tibial nail.  We placed the 2 distal interlocking screws and independent  screw holes taking care to missed the prior screw holes from the old nail, using perfect [place] technique.  The more proximal of the distal interlocks was not able to achieve adequate fixation of the distal cortex and this was swapped for an advanced  locking screw.  We then inserted 1 proximal interlocking screw through the guide.  The internal compression mechanism of the nail was utilized to apply compression at the nonunion site and compression could be visualized on fluoroscopy by bending of the  proximal screw.  A second transverse proximal interlocking screw was placed.  We then applied Pete Augment to biologically reinforce the healing potential at the nonunion site.      Final fluoroscopic images were obtained. Hardware was in satisfactory position in all planes.   Attention was then turned to closure. The wounds were copiously irrigated.  The incisions for the interlocking screws on both legs were closed with 2-0 Monocryl and 2-0 nylon for skin.   The quadricep tendon was closed using 0 PDS in the dermis and skin were closed with 2-0 Monocryl and 2-0 nylon respectively. The wounds were then sterilely dressed and drapes were taken down. Anesthesia was reversed, the patient was transferred to the   hospital bed and then taken to recovery in satisfactory condition.    At the end of the case, all needle and sponge counts were correct.     Dr. oRper was present/present and scrubbed for the entirety/all critical portions of the case.     Findings: Retained hardware in left and right tibia, nonunion of right tibial shaft  Blood loss: 50 cc  Tourniquet: Not applicable  Drains: None  Specimens: Deep culture of hardware x1  Implants: [place] T2 tibial nail 12 x 375, 3 standard interlocking screws, 1 advance locking interlocking screw  Complications: None  Condition: Return to PACU in stable condition    Plan:   1.  Weight  bearing as tolerated bilateral lower extremities  2.  Mobilize with PT POD#1  3.  Dressings to remain in place for 7 days, then can be removed and incision is left open to air  4.  DVT ppx with ASA 81bid  5.  Patient will be admitted to the orthopedic trauma service overnight for pain control and physical therapy                            Attestation:   Note Completion:  Attending Attestation I was present for the entire procedure    I am a: Resident/Fellow          Electronic Signatures:  Abraham Mcgovern (Resident))  (Signed 20-Apr-2023 10:17)   Authored: Post-Operative Note, Chart Review  Nomi Roper)  (Signed 26-Apr-2023 14:11)   Authored: Post-Operative Note, Chart Review, Note Completion   Co-Signer: Post-Operative Note, Chart Review      Last Updated: 26-Apr-2023 14:11 by Nomi Roper)

## 2023-10-23 ENCOUNTER — ANCILLARY PROCEDURE (OUTPATIENT)
Dept: RADIOLOGY | Facility: CLINIC | Age: 23
End: 2023-10-23
Payer: COMMERCIAL

## 2023-10-23 ENCOUNTER — OFFICE VISIT (OUTPATIENT)
Dept: ORTHOPEDIC SURGERY | Facility: CLINIC | Age: 23
End: 2023-10-23
Payer: COMMERCIAL

## 2023-10-23 VITALS — BODY MASS INDEX: 19.33 KG/M2 | WEIGHT: 135 LBS | HEIGHT: 70 IN

## 2023-10-23 DIAGNOSIS — T84.84XA PAINFUL ORTHOPAEDIC HARDWARE (CMS-HCC): ICD-10-CM

## 2023-10-23 DIAGNOSIS — S82.201E TYPE I OR II OPEN FRACTURE OF RIGHT TIBIA AND FIBULA WITH ROUTINE HEALING, SUBSEQUENT ENCOUNTER: ICD-10-CM

## 2023-10-23 DIAGNOSIS — S82.401E TYPE I OR II OPEN FRACTURE OF RIGHT TIBIA AND FIBULA WITH ROUTINE HEALING, SUBSEQUENT ENCOUNTER: ICD-10-CM

## 2023-10-23 DIAGNOSIS — S82.252D CLOSED DISPLACED COMMINUTED FRACTURE OF SHAFT OF LEFT TIBIA WITH ROUTINE HEALING, SUBSEQUENT ENCOUNTER: ICD-10-CM

## 2023-10-23 PROCEDURE — 1036F TOBACCO NON-USER: CPT | Performed by: ORTHOPAEDIC SURGERY

## 2023-10-23 PROCEDURE — 99214 OFFICE O/P EST MOD 30 MIN: CPT | Performed by: ORTHOPAEDIC SURGERY

## 2023-10-23 PROCEDURE — 73590 X-RAY EXAM OF LOWER LEG: CPT | Mod: 50,FY

## 2023-10-23 PROCEDURE — 73590 X-RAY EXAM OF LOWER LEG: CPT | Mod: BILATERAL PROCEDURE | Performed by: RADIOLOGY

## 2023-10-23 ASSESSMENT — PAIN SCALES - GENERAL: PAINLEVEL_OUTOF10: 7

## 2023-10-23 ASSESSMENT — PAIN - FUNCTIONAL ASSESSMENT: PAIN_FUNCTIONAL_ASSESSMENT: 0-10

## 2023-10-23 NOTE — PROGRESS NOTES
Subjective    Patient ID: Romero Landis is a 23 y.o. male.    Chief Complaint: Follow-up of the Left Leg and Follow-up of the Right Leg     Last Surgery: Right tibia exchange nailing for nonunion and left tibia interlock screw removal on April 28, 2023  HPI  Patient reported that he is doing well.  He is back to work.  He is performing home exercises.  He is on gabapentin.  He reports that after prolonged standing and walking and using stairs at work he needs to sit down for few minutes.  He is requesting a work note to give him this privileges.  Objective   Ortho Exam  Gen: The patient is alert and oriented ×3, is in no acute distress, and appear their stated age and weight.    Evaluation of the bilateral lower extremity reveals well-healed incision.  No tenderness to palpation at the fracture site of the interlock site.  Patient is able to ambulate independently.  He has been range of motion of the knee and ankle.  Image Results:  I personally reviewed bilateral tibia radiograph that reveals stable implant.  There is progression of bridging bone across the fracture site both in the tibia and fibula.  Good alignment.    Assessment/Plan   Encounter Diagnoses:  Painful orthopaedic hardware (CMS/HCC)    Closed displaced comminuted fracture of shaft of left tibia with routine healing, subsequent encounter    Type I or II open fracture of right tibia and fibula with routine healing, subsequent encounter  Patient is doing well since his nonunion repair.  His fracture progressively continue to heal.  Patient functionally is doing well.  He will continue activities as tolerated at this point.  I given the load stating that he should be allowed to sit at least few times during the workday after prolonged standing or walking.  I given a prescription for meloxicam to take as needed for pain.  We will continue his gabapentin.  I would like to see him back in 6 months we will obtain repeat x-ray of bilateral tibia at that  time.  Orders Placed This Encounter    XR tibia fibula bilateral 2 views     No follow-ups on file.

## 2023-10-23 NOTE — LETTER
October 23, 2023     Patient: Romero Landis   YOB: 2000   Date of Visit: 10/23/2023       To Whom It May Concern:    It is my medical opinion that Romero Landis  may return to work but able to take breaks every 3 hours for 15-20 minutes to allow to rest both of his legs.  .    If you have any questions or concerns, please don't hesitate to call.         Sincerely,        Nomi Roper MD    CC: No Recipients

## 2024-04-19 PROBLEM — R04.0 EPISTAXIS: Status: RESOLVED | Noted: 2023-09-22 | Resolved: 2024-04-19

## 2024-04-19 PROBLEM — K92.1 BLOOD IN STOOL: Status: RESOLVED | Noted: 2023-09-22 | Resolved: 2024-04-19

## 2024-04-19 RX ORDER — ADAPALENE 1 MG/G
GEL TOPICAL
COMMUNITY
Start: 2013-01-03

## 2024-04-22 ENCOUNTER — OFFICE VISIT (OUTPATIENT)
Dept: ORTHOPEDIC SURGERY | Facility: CLINIC | Age: 24
End: 2024-04-22
Payer: COMMERCIAL

## 2024-04-22 ENCOUNTER — HOSPITAL ENCOUNTER (OUTPATIENT)
Dept: RADIOLOGY | Facility: CLINIC | Age: 24
Discharge: HOME | End: 2024-04-22
Payer: COMMERCIAL

## 2024-04-22 DIAGNOSIS — S82.401E TYPE I OR II OPEN FRACTURE OF RIGHT TIBIA AND FIBULA WITH ROUTINE HEALING, SUBSEQUENT ENCOUNTER: ICD-10-CM

## 2024-04-22 DIAGNOSIS — T84.84XA PAINFUL ORTHOPAEDIC HARDWARE (CMS-HCC): Primary | ICD-10-CM

## 2024-04-22 DIAGNOSIS — S82.252D CLOSED DISPLACED COMMINUTED FRACTURE OF SHAFT OF LEFT TIBIA WITH ROUTINE HEALING, SUBSEQUENT ENCOUNTER: ICD-10-CM

## 2024-04-22 DIAGNOSIS — S82.201E TYPE I OR II OPEN FRACTURE OF RIGHT TIBIA AND FIBULA WITH ROUTINE HEALING, SUBSEQUENT ENCOUNTER: ICD-10-CM

## 2024-04-22 PROCEDURE — 73590 X-RAY EXAM OF LOWER LEG: CPT | Mod: 50

## 2024-04-22 PROCEDURE — 99214 OFFICE O/P EST MOD 30 MIN: CPT | Performed by: ORTHOPAEDIC SURGERY

## 2024-04-22 PROCEDURE — 73590 X-RAY EXAM OF LOWER LEG: CPT | Mod: BILATERAL PROCEDURE | Performed by: STUDENT IN AN ORGANIZED HEALTH CARE EDUCATION/TRAINING PROGRAM

## 2024-04-22 NOTE — PROGRESS NOTES
Subjective    Patient ID: Romero Landis is a 23 y.o. male.    Chief Complaint: No chief complaint on file.     Last Surgery: Right tibia exchange nailing for nonunion and left tibia interlock screw removal on April 28, 2023  HPI  Patient reported that he is doing well.  He is back to work.  He reported that the pain at the nonunion site has resolved.  However he is having pain over his proximal distal interlock screws on the right side.  The pain is worse with activities and better at rest.  He would like to have this interlock screws removed.  Patient is taking over-the-counter anti-inflammatories for this pain.    Objective   Ortho Exam  Gen: The patient is alert and oriented ×3, is in no acute distress, and appear their stated age and weight.    Evaluation of the bilateral lower extremity reveals well-healed incision.  There was tenderness to palpation at the fracture site of the interlock site.  Patient is able to ambulate independently.  He has been range of motion of the knee and ankle.  Image Results:  I personally reviewed bilateral tibia radiograph that reveals stable implant.  There is progression of bridging bone across the fracture site both in the tibia and fibula.  Fracture line no longer visible.  Good alignment.    Assessment/Plan   Encounter Diagnoses:  Painful orthopaedic hardware (CMS-HCC)    Closed displaced comminuted fracture of shaft of left tibia with routine healing, subsequent encounter    Type I or II open fracture of right tibia and fibula with routine healing, subsequent encounter  Patient is doing well since his nonunion repair.  Patient has radiographic union of both of his fracture right and left.  However he is having painful hardware at this point over the interlock.  He has tried conservative treatment including over-the-counter anti-inflammatories.  He is also completed physical therapy.  At this point he would like to have the proximal and distal interlock screws removed from  the right tibia.  We discussed risk-benefit and tentative he understood and wished to proceed.  He would like to have this done sometime in the middle of June.    Orders Placed This Encounter    XR tibia fibula bilateral 2 views     No follow-ups on file.

## 2024-06-13 ENCOUNTER — APPOINTMENT (OUTPATIENT)
Dept: RADIOLOGY | Facility: HOSPITAL | Age: 24
End: 2024-06-13
Payer: COMMERCIAL

## 2024-06-13 ENCOUNTER — ANESTHESIA EVENT (OUTPATIENT)
Dept: OPERATING ROOM | Facility: HOSPITAL | Age: 24
End: 2024-06-13
Payer: COMMERCIAL

## 2024-06-13 ENCOUNTER — ANESTHESIA (OUTPATIENT)
Dept: OPERATING ROOM | Facility: HOSPITAL | Age: 24
End: 2024-06-13
Payer: COMMERCIAL

## 2024-06-13 ENCOUNTER — HOSPITAL ENCOUNTER (OUTPATIENT)
Facility: HOSPITAL | Age: 24
Setting detail: OUTPATIENT SURGERY
Discharge: HOME | End: 2024-06-13
Attending: ORTHOPAEDIC SURGERY | Admitting: ORTHOPAEDIC SURGERY
Payer: COMMERCIAL

## 2024-06-13 VITALS
HEART RATE: 68 BPM | OXYGEN SATURATION: 99 % | TEMPERATURE: 97.7 F | WEIGHT: 140.87 LBS | BODY MASS INDEX: 19.72 KG/M2 | DIASTOLIC BLOOD PRESSURE: 72 MMHG | RESPIRATION RATE: 16 BRPM | HEIGHT: 71 IN | SYSTOLIC BLOOD PRESSURE: 114 MMHG

## 2024-06-13 DIAGNOSIS — S82.252D CLOSED DISPLACED COMMINUTED FRACTURE OF SHAFT OF LEFT TIBIA WITH ROUTINE HEALING, SUBSEQUENT ENCOUNTER: Primary | ICD-10-CM

## 2024-06-13 PROCEDURE — 7100000002 HC RECOVERY ROOM TIME - EACH INCREMENTAL 1 MINUTE: Performed by: ORTHOPAEDIC SURGERY

## 2024-06-13 PROCEDURE — 20680 REMOVAL OF IMPLANT DEEP: CPT | Performed by: ORTHOPAEDIC SURGERY

## 2024-06-13 PROCEDURE — 2500000004 HC RX 250 GENERAL PHARMACY W/ HCPCS (ALT 636 FOR OP/ED): Mod: SE | Performed by: ORTHOPAEDIC SURGERY

## 2024-06-13 PROCEDURE — 2500000005 HC RX 250 GENERAL PHARMACY W/O HCPCS: Mod: SE

## 2024-06-13 PROCEDURE — 2500000004 HC RX 250 GENERAL PHARMACY W/ HCPCS (ALT 636 FOR OP/ED): Mod: SE | Performed by: ANESTHESIOLOGY

## 2024-06-13 PROCEDURE — A4217 STERILE WATER/SALINE, 500 ML: HCPCS | Mod: SE | Performed by: ORTHOPAEDIC SURGERY

## 2024-06-13 PROCEDURE — 3700000001 HC GENERAL ANESTHESIA TIME - INITIAL BASE CHARGE: Performed by: ORTHOPAEDIC SURGERY

## 2024-06-13 PROCEDURE — 3700000002 HC GENERAL ANESTHESIA TIME - EACH INCREMENTAL 1 MINUTE: Performed by: ORTHOPAEDIC SURGERY

## 2024-06-13 PROCEDURE — 7100000009 HC PHASE TWO TIME - INITIAL BASE CHARGE: Performed by: ORTHOPAEDIC SURGERY

## 2024-06-13 PROCEDURE — 7100000010 HC PHASE TWO TIME - EACH INCREMENTAL 1 MINUTE: Performed by: ORTHOPAEDIC SURGERY

## 2024-06-13 PROCEDURE — 2500000004 HC RX 250 GENERAL PHARMACY W/ HCPCS (ALT 636 FOR OP/ED): Mod: SE

## 2024-06-13 PROCEDURE — 2720000007 HC OR 272 NO HCPCS: Performed by: ORTHOPAEDIC SURGERY

## 2024-06-13 PROCEDURE — 3600000004 HC OR TIME - INITIAL BASE CHARGE - PROCEDURE LEVEL FOUR: Performed by: ORTHOPAEDIC SURGERY

## 2024-06-13 PROCEDURE — 3600000009 HC OR TIME - EACH INCREMENTAL 1 MINUTE - PROCEDURE LEVEL FOUR: Performed by: ORTHOPAEDIC SURGERY

## 2024-06-13 PROCEDURE — 7100000001 HC RECOVERY ROOM TIME - INITIAL BASE CHARGE: Performed by: ORTHOPAEDIC SURGERY

## 2024-06-13 RX ORDER — SODIUM CHLORIDE 0.9 G/100ML
IRRIGANT IRRIGATION AS NEEDED
Status: DISCONTINUED | OUTPATIENT
Start: 2024-06-13 | End: 2024-06-13 | Stop reason: HOSPADM

## 2024-06-13 RX ORDER — LIDOCAINE HCL/PF 100 MG/5ML
SYRINGE (ML) INTRAVENOUS AS NEEDED
Status: DISCONTINUED | OUTPATIENT
Start: 2024-06-13 | End: 2024-06-13

## 2024-06-13 RX ORDER — TRANEXAMIC ACID 100 MG/ML
INJECTION, SOLUTION INTRAVENOUS AS NEEDED
Status: DISCONTINUED | OUTPATIENT
Start: 2024-06-13 | End: 2024-06-13

## 2024-06-13 RX ORDER — HYDROMORPHONE HYDROCHLORIDE 1 MG/ML
0.5 INJECTION, SOLUTION INTRAMUSCULAR; INTRAVENOUS; SUBCUTANEOUS EVERY 5 MIN PRN
Status: DISCONTINUED | OUTPATIENT
Start: 2024-06-13 | End: 2024-06-13 | Stop reason: HOSPADM

## 2024-06-13 RX ORDER — MIDAZOLAM HYDROCHLORIDE 1 MG/ML
INJECTION INTRAMUSCULAR; INTRAVENOUS AS NEEDED
Status: DISCONTINUED | OUTPATIENT
Start: 2024-06-13 | End: 2024-06-13

## 2024-06-13 RX ORDER — DOCUSATE SODIUM 100 MG/1
100 CAPSULE, LIQUID FILLED ORAL 2 TIMES DAILY
Qty: 12 CAPSULE | Refills: 0 | Status: SHIPPED | OUTPATIENT
Start: 2024-06-13

## 2024-06-13 RX ORDER — ONDANSETRON HYDROCHLORIDE 2 MG/ML
INJECTION, SOLUTION INTRAVENOUS AS NEEDED
Status: DISCONTINUED | OUTPATIENT
Start: 2024-06-13 | End: 2024-06-13

## 2024-06-13 RX ORDER — SODIUM CHLORIDE, SODIUM LACTATE, POTASSIUM CHLORIDE, CALCIUM CHLORIDE 600; 310; 30; 20 MG/100ML; MG/100ML; MG/100ML; MG/100ML
100 INJECTION, SOLUTION INTRAVENOUS CONTINUOUS
Status: DISCONTINUED | OUTPATIENT
Start: 2024-06-13 | End: 2024-06-13 | Stop reason: HOSPADM

## 2024-06-13 RX ORDER — FENTANYL CITRATE 50 UG/ML
INJECTION, SOLUTION INTRAMUSCULAR; INTRAVENOUS AS NEEDED
Status: DISCONTINUED | OUTPATIENT
Start: 2024-06-13 | End: 2024-06-13

## 2024-06-13 RX ORDER — ROCURONIUM BROMIDE 10 MG/ML
INJECTION, SOLUTION INTRAVENOUS AS NEEDED
Status: DISCONTINUED | OUTPATIENT
Start: 2024-06-13 | End: 2024-06-13

## 2024-06-13 RX ORDER — CEFAZOLIN 1 G/1
INJECTION, POWDER, FOR SOLUTION INTRAVENOUS AS NEEDED
Status: DISCONTINUED | OUTPATIENT
Start: 2024-06-13 | End: 2024-06-13

## 2024-06-13 RX ORDER — OXYCODONE HYDROCHLORIDE 5 MG/1
5 TABLET ORAL ONCE
Status: DISCONTINUED | OUTPATIENT
Start: 2024-06-13 | End: 2024-06-13 | Stop reason: HOSPADM

## 2024-06-13 RX ORDER — ASPIRIN 81 MG/1
81 TABLET ORAL 2 TIMES DAILY
Qty: 28 TABLET | Refills: 0 | Status: SHIPPED | OUTPATIENT
Start: 2024-06-13 | End: 2024-06-27

## 2024-06-13 RX ORDER — ACETAMINOPHEN 325 MG/1
650 TABLET ORAL ONCE
Status: COMPLETED | OUTPATIENT
Start: 2024-06-13 | End: 2024-06-13

## 2024-06-13 RX ORDER — PROPOFOL 10 MG/ML
INJECTION, EMULSION INTRAVENOUS AS NEEDED
Status: DISCONTINUED | OUTPATIENT
Start: 2024-06-13 | End: 2024-06-13

## 2024-06-13 RX ORDER — OXYCODONE HYDROCHLORIDE 5 MG/1
5 TABLET ORAL EVERY 6 HOURS PRN
Qty: 12 TABLET | Refills: 0 | Status: SHIPPED | OUTPATIENT
Start: 2024-06-13 | End: 2024-06-16

## 2024-06-13 RX ORDER — HYDROMORPHONE HYDROCHLORIDE 1 MG/ML
0.2 INJECTION, SOLUTION INTRAMUSCULAR; INTRAVENOUS; SUBCUTANEOUS EVERY 5 MIN PRN
Status: DISCONTINUED | OUTPATIENT
Start: 2024-06-13 | End: 2024-06-13 | Stop reason: HOSPADM

## 2024-06-13 RX ORDER — LIDOCAINE HYDROCHLORIDE 10 MG/ML
0.1 INJECTION INFILTRATION; PERINEURAL ONCE
Status: DISCONTINUED | OUTPATIENT
Start: 2024-06-13 | End: 2024-06-13 | Stop reason: HOSPADM

## 2024-06-13 RX ADMIN — ACETAMINOPHEN 650 MG: 325 TABLET ORAL at 12:33

## 2024-06-13 RX ADMIN — SODIUM CHLORIDE, POTASSIUM CHLORIDE, SODIUM LACTATE AND CALCIUM CHLORIDE 100 ML/HR: 600; 310; 30; 20 INJECTION, SOLUTION INTRAVENOUS at 11:10

## 2024-06-13 SDOH — HEALTH STABILITY: MENTAL HEALTH: CURRENT SMOKER: 0

## 2024-06-13 ASSESSMENT — PAIN - FUNCTIONAL ASSESSMENT
PAIN_FUNCTIONAL_ASSESSMENT: 0-10
PAIN_FUNCTIONAL_ASSESSMENT: 0-10

## 2024-06-13 ASSESSMENT — COLUMBIA-SUICIDE SEVERITY RATING SCALE - C-SSRS
6. HAVE YOU EVER DONE ANYTHING, STARTED TO DO ANYTHING, OR PREPARED TO DO ANYTHING TO END YOUR LIFE?: NO
1. IN THE PAST MONTH, HAVE YOU WISHED YOU WERE DEAD OR WISHED YOU COULD GO TO SLEEP AND NOT WAKE UP?: NO
2. HAVE YOU ACTUALLY HAD ANY THOUGHTS OF KILLING YOURSELF?: NO

## 2024-06-13 ASSESSMENT — PAIN SCALES - GENERAL
PAINLEVEL_OUTOF10: 5 - MODERATE PAIN
PAINLEVEL_OUTOF10: 2
PAINLEVEL_OUTOF10: 6
PAINLEVEL_OUTOF10: 4
PAINLEVEL_OUTOF10: 3
PAINLEVEL_OUTOF10: 5 - MODERATE PAIN
PAINLEVEL_OUTOF10: 2
PAINLEVEL_OUTOF10: 2

## 2024-06-13 NOTE — ANESTHESIA PROCEDURE NOTES
Peripheral IV  Date/Time: 6/13/2024 8:11 AM      Placement  Needle size: 22 G  Laterality: right  Location: forearm  Local anesthetic: none  Site prep: chlorhexidine

## 2024-06-13 NOTE — H&P
[facility] Department of Orthopaedic Surgery   Surgical History & Physical >30 Days    Reason for Surgery: L tibia painful hardware  Planned Procedure: L tibia removal of hardware    History & Physical Reviewed:  Romero Landis is a 23 y.o. male presenting with the above symptoms. This patient was evaluated as an outpatient, and a plan was made for operative management. Risks and benefits were discussed, and the patient and/or caregivers elected to proceed. The patient presents for the above listed procedure today.      Past Medical History:   Diagnosis Date    Allergic rhinitis 05/08/2004    Formatting of this note might be different from the original. Overview: ALLERGIC RHINITIS {UNSPECIFIED CAUSE Formatting of this note might be different from the original. ALLERGIC RHINITIS {UNSPECIFIED CAUSE    Blood in stool 09/22/2023    Epistaxis 09/22/2023     Past Surgical History:   Procedure Laterality Date    OTHER SURGICAL HISTORY  08/11/2022    Elbow surgery    OTHER SURGICAL HISTORY  08/11/2022    Leg surgery     Social History     Tobacco Use    Smoking status: Never    Smokeless tobacco: Never   Substance Use Topics    Alcohol use: Yes     Alcohol/week: 2.0 standard drinks of alcohol     Types: 2 Standard drinks or equivalent per week     Prior to Admission medications    Medication Sig Start Date End Date Taking? Authorizing Provider   acetaminophen (Tylenol) 325 mg tablet Take by mouth every 4 hours. 7/10/22   Historical Provider, MD   adapalene (Differin) 0.1 % gel APPLY PEA SIZE AMOUNT TO FACE THREE TIMES A WEEK AT AT BEDTIME ID WELL T OLERATED AFTER TWO WEEKS, ADVANCE TO ONCE EVERY NIGHT 1/3/13   Historical Provider, MD   gabapentin (Neurontin) 300 mg capsule Take 1 capsule (300 mg) by mouth once daily at bedtime. 8/13/22   Historical Provider, MD   ibuprofen 600 mg tablet Take 1 tablet (600 mg) by mouth every 8 hours if needed. 9/27/21   Historical Provider, MD   oxymetazoline 0.05 % nasal spray  Administer into affected nostril(s). 8/16/23   Historical Provider, MD     Allergies   Allergen Reactions    Orange Shortness of breath     Cannot breathe when oranges are being peeled    Anderson Syrup Shortness of breath     Cannot breathe when oranges are being peeled    Other Unknown       Review of Systems:   Gen: Denies recent weight loss  Neuro: Denies recent confusion  Ophtho: Denies changes in vision  ENT: Denies changes in hearing  Endo: Denies weight loss/weight gain  CV: Denies chest pain  Resp: Denies shortness of breath  GI: Denies melena/hematochezia  : Denies painful urination  MSK: Per above HPI  Heme: No abnormal bleeding  Psych: Denies hallucinations    Physical Exam:  - Constitutional: No acute distress, cooperative  - Eyes: EOM grossly intact  - Head/Neck: Trachea midline  - Respiratory/Thorax: Normal work of breathing  - Cardiovascular: RRR on peripheral palpation  - Gastrointestinal: Nondistended  - Psychological: Appropriate mood/behavior  - Skin: Warm and dry. Additional findings in musculoskeletal evaluation  - Musculoskeletal:   Evaluation of the bilateral lower extremity reveals well-healed incision.  There was tenderness to palpation at the fracture site of the interlock site.  Patient is able to ambulate independently.  He has been range of motion of the knee and ankle.     ERAS patient?: No    COVID-19 Risk Consent:   Surgeon has reviewed the key risks related to jose roberto COVID-19 and subsequent sequelae.       Phan Garcia MD 06/13/24

## 2024-06-13 NOTE — DISCHARGE INSTRUCTIONS
You are weight bearing as tolerated on the right lower extremity. Your post-operative dressing can be removed in 5 days. The sutures will remain in place until your follow-up visit with Dr. Roper. You have been given a short prescription for oxycodone for severe pain and colace to help prevent constipation while taking narcotics. If your pain is not severe we recommend using over the counter tylenol for pain relief. You have also been prescribed aspirin 81 mg to be taking twice daily to help prevent blood clots after surgery. Should any questions arise, please reach out to Dr. Roper's office.

## 2024-06-13 NOTE — BRIEF OP NOTE
Date: 2024  OR Location: [facility] OR    Name: Romero Landis, : 2000, Age: 23 y.o., MRN: [id number], Sex: male    Diagnosis  Pre-op Diagnosis     * Painful orthopaedic hardware (CMS-HCC) [T84.84XA] Post-op Diagnosis     * Painful orthopaedic hardware (CMS-HCC) [T84.84XA]     Procedures  Right tibia nail interlock screw  Removal   - [place] REMOVAL IMPLANT DEEP      Surgeons      * Nomi Roper - Primary    Resident/Fellow/Other Assistant:  Surgeons and Role:     * Tomas Mack MD - Resident - Assisting     * Melani Agarwal PA-C - Resident - Assisting    Procedure Summary  Anesthesia: General  ASA: II  Anesthesia Staff: Anesthesiologist: Ramo David DO  C-AA: ZORAIDA Barragan  Estimated Blood Loss: 2mL  Intra-op Medications:   Administrations occurring from 0855 to 1040 on 24:   Medication Name Total Dose   sodium chloride 0.9 % irrigation solution 1,000 mL              Anesthesia Record               Intraprocedure I/O Totals          Intake    Tranexamic Acid 0.00 mL    The total shown is the total volume documented since Anesthesia Start was filed.    Total Intake 0 mL          Specimen: No specimens collected     Staff:   Circulator: Zoila  Circulator: Meg Noe Person: Bobo Noe Person: Fish          Findings: painful hardware    Complications:  None; patient tolerated the procedure well.     Disposition: PACU - hemodynamically stable.  Condition: stable  Specimens Collected: No specimens collected    Tomas Mack MD

## 2024-06-13 NOTE — OP NOTE
Right tibia nail interlock screw  Removal (R) Operative Note     Date: 2024  OR Location: [facility] OR    Name: Romero Landis, : 2000, Age: 23 y.o., MRN: [id number], Sex: male    Diagnosis  Pre-op Diagnosis     * Painful orthopaedic hardware (CMS-HCC) [T84.84XA] Post-op Diagnosis     * Painful orthopaedic hardware (CMS-HCC) [T84.84XA]     Procedures  Right tibia nail interlock screw  Removal   - [place] REMOVAL IMPLANT DEEP  Proximal and distal tibia    Surgeons      * Nomi Roper - Primary    Resident/Fellow/Other Assistant:  Surgeons and Role:     * Tomas Mack MD - Resident - Assisting     * Melani Agarwal PA-C - Resident - Assisting    Procedure Summary  Anesthesia: General  ASA: II  Anesthesia Staff: Anesthesiologist: Ramo David DO  C-AA: ZORAIDA Barragan  Estimated Blood Loss: 2mL  Intra-op Medications:   Administrations occurring from 0855 to 1040 on 24:   Medication Name Total Dose   sodium chloride 0.9 % irrigation solution 1,000 mL              Anesthesia Record               Intraprocedure I/O Totals          Intake    Tranexamic Acid 0.00 mL    The total shown is the total volume documented since Anesthesia Start was filed.    Total Intake 0 mL          Specimen: No specimens collected     Staff:   Fabioulator: Zoila  Circulator: Meg Noe Person: Bobo Pepperub Person: Rafalsephill         Drains and/or Catheters: * None in log *    Tourniquet Times:   * Missing tourniquet times found for documented tourniquets in lo *     Implants:     Findings: painful hardware    Indications:The patient is a 23-year-old male who is status post right tibia exchange nailing for tibial nonunion on 2023 who presented to clinic with symptomatic hardware. His nonunion has healed and therefore the decision was made to proceed with a removal of the interlock screws. The risks and benefits of surgery including the risk of damage to nerves, vessels, and other surrounding  structures as well as the risk of infection were discussed with the patient and he was in agreement to proceed with surgery.    The patient was seen in the preoperative area. The risks, benefits, complications, treatment options, non-operative alternatives, expected recovery and outcomes were discussed with the patient. The possibilities of reaction to medication, pulmonary aspiration, injury to surrounding structures, bleeding, recurrent infection, the need for additional procedures, failure to diagnose a condition, and creating a complication requiring transfusion or operation were discussed with the patient. The patient concurred with the proposed plan, giving informed consent.  The site of surgery was properly noted/marked if necessary per policy. The patient has been actively warmed in preoperative area. Preoperative antibiotics have been ordered and given within 1 hours of incision. Venous thrombosis prophylaxis have been ordered including unilateral sequential compression device    Procedure Details:   Patient was seen in the preoperative holding area where informed consent was obtained from the patient. The operative site was marked.  Patient was brought to the operating room where the preoperative time-out.  The patient was then transferred to the operative table prophylactic antibiotics were given. Anesthesia performed a preoperative block to help with pain control. All bony prominences were well padded. The operative extremity was prepped and draped in the usual sterile fashion.     We first started by localizing the location of the proximal and distal interlock under fluoroscopic guidance. We marked out the location of these screws. We utilized a 10-blade to make a sharp dissection through the skin and subcutaneous tissue through small incisions. We were then able to visualize the proximal interlock screws and removed them with a screw . We made small incisions over the distal interlock screws  using a 10-blade through the skin and subcutaneous tissue. We were then able to visualize the distal interlock screws and removed them with a screw . Final AP fluoroscopic images were obtained which demonstrated the hardware was removed. The wounds were then thoroughly irrigated with normal saline.  The incisions were closed with interrupted vertical mattress sutures with 3-0 nylon. The incisions were covered with sterile dressing. The patient was awoken from general anesthesia without complication and transported to the PACU in stable condition.    Complications:  None; patient tolerated the procedure well.    Disposition: PACU - hemodynamically stable.  Condition: stable         Additional Details:   The patient will be weight bearing as tolerated on the right lower extremity. He will be discharged home today and will be started on appropriate DVT prophylaxis, at least ASA 81 BID x2weeks. He will follow-up in clinic in 2 weeks with AP and lateral radiographs of the right leg and for suture removal.     Dr. Roper was present for all critical portions of the case.     Tomas Mack MD on behalf of Dr. Nomi Roper MD

## 2024-06-13 NOTE — ANESTHESIA PROCEDURE NOTES
Airway  Date/Time: 6/13/2024 10:16 AM  Urgency: elective    Airway not difficult    Staffing  Performed: ZORAIDA   Authorized by: Ramo David DO    Performed by: ZORAIDA Barragan  Patient location during procedure: OR    Indications and Patient Condition  Indications for airway management: anesthesia  Spontaneous ventilation: present  Sedation level: deep  Preoxygenated: yes  Patient position: sniffing  Mask difficulty assessment: 1 - vent by mask    Final Airway Details  Final airway type: endotracheal airway      Successful airway: ETT  Cuffed: yes   Successful intubation technique: direct laryngoscopy  Facilitating devices/methods: cricoid pressure  Endotracheal tube insertion site: oral  Blade: Thomas  Blade size: #2  ETT size (mm): 7.5  Cormack-Lehane Classification: grade IIa - partial view of glottis  Placement verified by: chest auscultation, capnometry and palpation of cuff   Measured from: lips  ETT to lips (cm): 21  Number of attempts at approach: 1

## 2024-06-13 NOTE — ANESTHESIA POSTPROCEDURE EVALUATION
Patient: Romero Landis    Procedure Summary       Date: 06/13/24 Room / Location: [facility] OR 09 / [facility] [place] [facility]    Anesthesia Start: 0947 Anesthesia Stop: 1113    Procedure: Right tibia nail interlock screw  Removal (Right: Leg Lower) Diagnosis:       Painful orthopaedic hardware (CMS-HCC)      (Painful orthopaedic hardware (CMS-HCC) [T84.84XA])    Surgeons: Nomi Roper MD Responsible Provider: Ramo David DO    Anesthesia Type: general ASA Status: 2            Anesthesia Type: general    Vitals Value Taken Time   /78 06/13/24 1245   Temp 36.1 °C (97 °F) 06/13/24 1245   Pulse 61 06/13/24 1245   Resp 11 06/13/24 1245   SpO2 99 % 06/13/24 1245       Anesthesia Post Evaluation    Patient location during evaluation: PACU  Patient participation: complete - patient participated  Level of consciousness: awake  Pain management: adequate  Airway patency: patent  Cardiovascular status: acceptable  Respiratory status: acceptable  Hydration status: acceptable  Postoperative Nausea and Vomiting: none        There were no known notable events for this encounter.

## 2024-06-13 NOTE — ANESTHESIA PREPROCEDURE EVALUATION
Patient: Romero Landis    Procedure Information       Date/Time: 06/13/24 0855    Procedure: Right tibia nail interlock screw  Removal (Right: Leg Lower)    Location: [facility] OR 09 / [facility] [place]    Surgeons: Nomi Roper MD            Relevant Problems   Skin   (+) Atopic dermatitis       Clinical information reviewed:   Tobacco  Allergies  Meds   Med Hx  Surg Hx   Fam Hx  Soc Hx        NPO Detail:  NPO/Void Status  Carbohydrate Drink Given Prior to Surgery? : N  Date of Last Liquid: 06/13/24  Time of Last Liquid: 0833  Date of Last Solid: 06/13/24  Time of Last Solid: 0833  Last Intake Type: Clear fluids  Time of Last Void: 0833         Physical Exam    Airway  Mallampati: I  TM distance: >3 FB  Neck ROM: full     Cardiovascular - normal exam  Rhythm: regular  Rate: normal     Dental    Pulmonary - normal exam  Breath sounds clear to auscultation     Abdominal            Anesthesia Plan    History of general anesthesia?: yes  History of complications of general anesthesia?: no    ASA 2     general     The patient is not a current smoker.  Patient did not smoke on day of procedure.    intravenous induction   Postoperative administration of opioids is intended.  Anesthetic plan and risks discussed with patient.    Plan discussed with attending.

## 2024-06-24 ENCOUNTER — TELEPHONE (OUTPATIENT)
Dept: ORTHOPEDIC SURGERY | Facility: CLINIC | Age: 24
End: 2024-06-24
Payer: COMMERCIAL

## 2024-06-24 ENCOUNTER — PATIENT MESSAGE (OUTPATIENT)
Dept: ORTHOPEDIC SURGERY | Facility: CLINIC | Age: 24
End: 2024-06-24
Payer: COMMERCIAL

## 2024-06-24 NOTE — TELEPHONE ENCOUNTER
PATIENT PHONE CALL     Family Member called the office on 06/24/24    Surgical Procedure: JOSÉ MIGUEL R Tibial nail   DOS: 06/13/2024   Surgeon: Dr. Nomi Roepr    Spoke with family member on the phone this morning. C/O drainage from Romero's ankle wound, describes it as not bloody. Asked family member to have patient send a Szl message with picture attached. He will have patient do so.     Awaiting picture/message from patient    Patient Call Back: [phone]     Marika Moreno LPN

## 2024-06-27 NOTE — TELEPHONE ENCOUNTER
Patient sent picture, see mychart message.     Melani looked at picture, no concern for infection. Keep covered and wrap with ace wrap to help with swelling, elevate and ice.     Relayed information to patient.     No further questions.     Marika Moreno LPN

## 2024-07-08 ENCOUNTER — HOSPITAL ENCOUNTER (OUTPATIENT)
Dept: RADIOLOGY | Facility: CLINIC | Age: 24
Discharge: HOME | End: 2024-07-08
Payer: COMMERCIAL

## 2024-07-08 ENCOUNTER — OFFICE VISIT (OUTPATIENT)
Dept: ORTHOPEDIC SURGERY | Facility: CLINIC | Age: 24
End: 2024-07-08
Payer: COMMERCIAL

## 2024-07-08 DIAGNOSIS — T84.84XA PAINFUL ORTHOPAEDIC HARDWARE (CMS-HCC): Primary | ICD-10-CM

## 2024-07-08 DIAGNOSIS — T84.84XA PAINFUL ORTHOPAEDIC HARDWARE (CMS-HCC): ICD-10-CM

## 2024-07-08 PROCEDURE — 73590 X-RAY EXAM OF LOWER LEG: CPT | Mod: RT

## 2024-07-08 PROCEDURE — 99211 OFF/OP EST MAY X REQ PHY/QHP: CPT

## 2024-07-08 PROCEDURE — 73590 X-RAY EXAM OF LOWER LEG: CPT | Mod: RIGHT SIDE | Performed by: RADIOLOGY

## 2024-07-08 NOTE — PROGRESS NOTES
Subjective    Patient ID: Romero Landis is a 23 y.o. male.    Chief Complaint: POV- Right tibia     Last Surgery: Right tibia nail interlock screw  Removal - Right  Last Surgery Date: 6/13/2024    HPI  Patient is a 23 y.o. male who is s/p right tibial nail interlock screw removal. Date of surgery was 6/13/2024. Patient continues to be weight bearing as tolerated at this time and denies issues with incision. Patient has completed aspirin therapy. Patient is not in physical therapy. Patient denies fever or chills, N/T or calf pain.     ROS: All other systems have been reviewed and are negative except as previously noted in history of present illness.      IMP:  Problem List Items Addressed This Visit       Painful orthopaedic hardware (CMS-[facility]) - Primary    Relevant Orders    XR tibia fibula right 2 views (Completed)     Objective   General: Alert and oriented x 3, NAD, respirations easy and unlabored with no audible wheezes, skin warm and dry, speech and dress appropriate for noted age, affect euthymic.     Musculoskeletal: Right Lower Extremity  incisions c/d/i  mild swelling to lower leg  compartments soft  no calf tenderness  sensation intact to light touch  motor intact including TA/GS/EHL  palpable DP/PT pulses 2+     X-ray: Images of right tibia/fibula reviewed personally by me today and reveal interval change of proximal and distal interlock screw removal.      Assessment/Plan   Encounter Diagnoses:  Painful orthopaedic hardware (CMS-HCC)    PLAN: Patient is s/p right tibial nail interlock screw removal. Sutures were removed at this visit. Patient is overall doing well. He is weight bearing as tolerated on the right leg. He has completed aspirin therapy and is not in physical therapy. Imaging reveals interval change of proximal and distal interlock screw removal. Patient is educated that he may continue to weight bear as tolerated and can return to work with no restrictions. Patient will follow up in 3  months. Patient is in agreement with this plan. Xrays of the right tibia/fibula will be needed.     Orders Placed This Encounter    XR tibia fibula right 2 views     No follow-ups on file.

## 2024-09-13 ENCOUNTER — PATIENT MESSAGE (OUTPATIENT)
Dept: ORTHOPEDIC SURGERY | Facility: HOSPITAL | Age: 24
End: 2024-09-13
Payer: COMMERCIAL

## 2024-09-30 ENCOUNTER — APPOINTMENT (OUTPATIENT)
Dept: ORTHOPEDIC SURGERY | Facility: CLINIC | Age: 24
End: 2024-09-30
Payer: COMMERCIAL

## 2024-10-07 ENCOUNTER — HOSPITAL ENCOUNTER (OUTPATIENT)
Dept: RADIOLOGY | Facility: CLINIC | Age: 24
Discharge: HOME | End: 2024-10-07
Payer: COMMERCIAL

## 2024-10-07 ENCOUNTER — OFFICE VISIT (OUTPATIENT)
Dept: ORTHOPEDIC SURGERY | Facility: CLINIC | Age: 24
End: 2024-10-07
Payer: COMMERCIAL

## 2024-10-07 DIAGNOSIS — T84.84XA PAINFUL ORTHOPAEDIC HARDWARE (CMS-HCC): ICD-10-CM

## 2024-10-07 DIAGNOSIS — S82.401E TYPE I OR II OPEN FRACTURE OF RIGHT TIBIA AND FIBULA WITH ROUTINE HEALING, SUBSEQUENT ENCOUNTER: ICD-10-CM

## 2024-10-07 DIAGNOSIS — S82.201E TYPE I OR II OPEN FRACTURE OF RIGHT TIBIA AND FIBULA WITH ROUTINE HEALING, SUBSEQUENT ENCOUNTER: ICD-10-CM

## 2024-10-07 PROCEDURE — 99213 OFFICE O/P EST LOW 20 MIN: CPT | Performed by: ORTHOPAEDIC SURGERY

## 2024-10-07 PROCEDURE — 73590 X-RAY EXAM OF LOWER LEG: CPT | Mod: RT

## 2024-10-07 PROCEDURE — 73590 X-RAY EXAM OF LOWER LEG: CPT | Mod: RIGHT SIDE | Performed by: RADIOLOGY

## 2025-01-27 DIAGNOSIS — S82.202G: ICD-10-CM

## 2025-01-27 DIAGNOSIS — T84.84XA PAINFUL ORTHOPAEDIC HARDWARE (CMS-HCC): ICD-10-CM

## 2025-01-27 DIAGNOSIS — S82.252G CLOSED DISPLACED COMMINUTED FRACTURE OF SHAFT OF LEFT TIBIA WITH DELAYED HEALING, SUBSEQUENT ENCOUNTER: ICD-10-CM

## (undated) DEVICE — IRRIGATION SET, Y, LARGE BORE

## (undated) DEVICE — DRAPE, SHEET, U, W/ADHESIVE STRIP, IMPERVIOUS, 60 X 70 IN, DISPOSABLE, LF, STERILE

## (undated) DEVICE — ELECTRODE, ELECTROSURGICAL, BLADE, INSULATED, ENT/IMA, STERILE

## (undated) DEVICE — TOWEL, SURGICAL, NEURO, O/R, 16 X 26, BLUE, STERILE

## (undated) DEVICE — BANDAGE, GAUZE, CONFORMING, KERLIX, 6 PLY, 4.5 IN X 4.1 YD

## (undated) DEVICE — COVER, CART, 45 X 27 X 48 IN, CLEAR

## (undated) DEVICE — APPLICATOR, CHLORAPREP, W/ORANGE TINT, 26ML

## (undated) DEVICE — APPLICATOR, PREP, CHLORAPREP, W/ORANGE TINT, 10.5ML

## (undated) DEVICE — DRAPE, SHEET, THREE QUARTER, FAN FOLD, 57 X 77 IN

## (undated) DEVICE — MANIFOLD, 4 PORT NEPTUNE STANDARD

## (undated) DEVICE — Device

## (undated) DEVICE — SUTURE, PDS II, 0, 18 IN, CT-1, VIOLET

## (undated) DEVICE — BANDAGE, COFLEX, 6 X 5 YDS, TAN, STERILE, LF

## (undated) DEVICE — STAPLER, SKIN PROXIMATE, 35 WIDE

## (undated) DEVICE — BANDAGE, ELASTIC, MATRIX, SELF-CLOSURE, 4 IN X 5 YD, LF

## (undated) DEVICE — SUTURE, MONOCRYL, 2-0, 36 IN, CT-1, UNDYED